# Patient Record
Sex: FEMALE | Race: ASIAN | NOT HISPANIC OR LATINO
[De-identification: names, ages, dates, MRNs, and addresses within clinical notes are randomized per-mention and may not be internally consistent; named-entity substitution may affect disease eponyms.]

---

## 2022-04-25 PROBLEM — Z00.00 ENCOUNTER FOR PREVENTIVE HEALTH EXAMINATION: Status: ACTIVE | Noted: 2022-04-25

## 2022-05-02 ENCOUNTER — APPOINTMENT (OUTPATIENT)
Dept: CARDIOLOGY | Facility: CLINIC | Age: 27
End: 2022-05-02
Payer: COMMERCIAL

## 2022-05-02 VITALS
DIASTOLIC BLOOD PRESSURE: 70 MMHG | WEIGHT: 128 LBS | TEMPERATURE: 97.8 F | RESPIRATION RATE: 18 BRPM | SYSTOLIC BLOOD PRESSURE: 116 MMHG | HEART RATE: 113 BPM | BODY MASS INDEX: 19.18 KG/M2 | OXYGEN SATURATION: 96 % | HEIGHT: 68.5 IN

## 2022-05-02 DIAGNOSIS — R94.31 ABNORMAL ELECTROCARDIOGRAM [ECG] [EKG]: ICD-10-CM

## 2022-05-02 DIAGNOSIS — Z01.810 ENCOUNTER FOR PREPROCEDURAL CARDIOVASCULAR EXAMINATION: ICD-10-CM

## 2022-05-02 PROCEDURE — 99203 OFFICE O/P NEW LOW 30 MIN: CPT

## 2022-05-02 PROCEDURE — 93306 TTE W/DOPPLER COMPLETE: CPT

## 2022-05-02 NOTE — DISCUSSION/SUMMARY
[FreeTextEntry1] : 26 year-old female with no significant PMH presents for evaluation of abnormal ECG and cardiac clearance prior to  breast surgery.  Patient reports no cardiac history.  Patient was noted to have an abnormal ECG by PCP, showing RA enlargement.  Patient denies CP, SOB, palpitations, or lightheadedness.  Patient denies h/o syncope.  I advised patient to undergo an echocardiogram.  Patient underwent an echocardiogram and it showed normal LV function without significant valvular pathology. \par \par (1) Cardiac clearance prior to breast surgery - Patient reports no cardiac history.  Patient denies exertional symptoms.  She has an abnormal ECG, showing KYLER.  Patient underwent an echocardiogram and it showed normal LV function without significant valvular pathology.  RA was normal in size on echo.  Patient is cleared for surgery and anesthesia.  \par \par (2) Followup - as needed.

## 2022-05-02 NOTE — HISTORY OF PRESENT ILLNESS
[FreeTextEntry1] : 26 year-old female with no significant PMH presents for evaluation of abnormal ECG and cardiac clearance prior to  breast surgery.  Patient reports no cardiac history.  Patient was noted to have an abnormal ECG by PCP, showing RA enlargement.  Patient denies CP, SOB, palpitations, or lightheadedness.  Patient denies h/o syncope.  I advised patient to undergo an echocardiogram.  Patient underwent an echocardiogram and it showed normal LV function without significant valvular pathology.

## 2025-06-16 ENCOUNTER — EMERGENCY (EMERGENCY)
Facility: HOSPITAL | Age: 30
LOS: 1 days | End: 2025-06-16
Attending: STUDENT IN AN ORGANIZED HEALTH CARE EDUCATION/TRAINING PROGRAM
Payer: COMMERCIAL

## 2025-06-16 VITALS
TEMPERATURE: 101 F | SYSTOLIC BLOOD PRESSURE: 99 MMHG | DIASTOLIC BLOOD PRESSURE: 69 MMHG | HEART RATE: 105 BPM | RESPIRATION RATE: 16 BRPM | WEIGHT: 119.93 LBS | OXYGEN SATURATION: 100 %

## 2025-06-16 VITALS
DIASTOLIC BLOOD PRESSURE: 55 MMHG | TEMPERATURE: 99 F | HEART RATE: 86 BPM | OXYGEN SATURATION: 97 % | RESPIRATION RATE: 16 BRPM | SYSTOLIC BLOOD PRESSURE: 98 MMHG

## 2025-06-16 LAB
ALBUMIN SERPL ELPH-MCNC: 4.2 G/DL — SIGNIFICANT CHANGE UP (ref 3.3–5)
ALP SERPL-CCNC: 52 U/L — SIGNIFICANT CHANGE UP (ref 40–120)
ALT FLD-CCNC: 12 U/L — SIGNIFICANT CHANGE UP (ref 10–45)
ANION GAP SERPL CALC-SCNC: 18 MMOL/L — HIGH (ref 5–17)
APPEARANCE UR: CLEAR — SIGNIFICANT CHANGE UP
AST SERPL-CCNC: 16 U/L — SIGNIFICANT CHANGE UP (ref 10–40)
BASOPHILS # BLD AUTO: 0 K/UL — SIGNIFICANT CHANGE UP (ref 0–0.2)
BASOPHILS NFR BLD AUTO: 0 % — SIGNIFICANT CHANGE UP (ref 0–2)
BILIRUB SERPL-MCNC: 0.7 MG/DL — SIGNIFICANT CHANGE UP (ref 0.2–1.2)
BILIRUB UR-MCNC: NEGATIVE — SIGNIFICANT CHANGE UP
BUN SERPL-MCNC: 20 MG/DL — SIGNIFICANT CHANGE UP (ref 7–23)
CALCIUM SERPL-MCNC: 9 MG/DL — SIGNIFICANT CHANGE UP (ref 8.4–10.5)
CHLORIDE SERPL-SCNC: 102 MMOL/L — SIGNIFICANT CHANGE UP (ref 96–108)
CO2 SERPL-SCNC: 20 MMOL/L — LOW (ref 22–31)
COLOR SPEC: YELLOW — SIGNIFICANT CHANGE UP
CREAT SERPL-MCNC: 0.7 MG/DL — SIGNIFICANT CHANGE UP (ref 0.5–1.3)
DIFF PNL FLD: NEGATIVE — SIGNIFICANT CHANGE UP
EGFR: 120 ML/MIN/1.73M2 — SIGNIFICANT CHANGE UP
EGFR: 120 ML/MIN/1.73M2 — SIGNIFICANT CHANGE UP
EOSINOPHIL # BLD AUTO: 0 K/UL — SIGNIFICANT CHANGE UP (ref 0–0.5)
EOSINOPHIL NFR BLD AUTO: 0 % — SIGNIFICANT CHANGE UP (ref 0–6)
FLUAV AG NPH QL: SIGNIFICANT CHANGE UP
FLUBV AG NPH QL: SIGNIFICANT CHANGE UP
GAS PNL BLDV: SIGNIFICANT CHANGE UP
GAS PNL BLDV: SIGNIFICANT CHANGE UP
GIANT PLATELETS BLD QL SMEAR: PRESENT — SIGNIFICANT CHANGE UP
GLUCOSE SERPL-MCNC: 127 MG/DL — HIGH (ref 70–99)
GLUCOSE UR QL: NEGATIVE MG/DL — SIGNIFICANT CHANGE UP
HCG SERPL-ACNC: <2 MIU/ML — SIGNIFICANT CHANGE UP
HCT VFR BLD CALC: 41.2 % — SIGNIFICANT CHANGE UP (ref 34.5–45)
HGB BLD-MCNC: 14.2 G/DL — SIGNIFICANT CHANGE UP (ref 11.5–15.5)
KETONES UR QL: 15 MG/DL
LEUKOCYTE ESTERASE UR-ACNC: NEGATIVE — SIGNIFICANT CHANGE UP
LIDOCAIN IGE QN: 29 U/L — SIGNIFICANT CHANGE UP (ref 7–60)
LYMPHOCYTES # BLD AUTO: 0.39 K/UL — LOW (ref 1–3.3)
LYMPHOCYTES # BLD AUTO: 3.4 % — LOW (ref 13–44)
MANUAL SMEAR VERIFICATION: SIGNIFICANT CHANGE UP
MCHC RBC-ENTMCNC: 31.9 PG — SIGNIFICANT CHANGE UP (ref 27–34)
MCHC RBC-ENTMCNC: 34.5 G/DL — SIGNIFICANT CHANGE UP (ref 32–36)
MCV RBC AUTO: 92.6 FL — SIGNIFICANT CHANGE UP (ref 80–100)
MONOCYTES # BLD AUTO: 0.9 K/UL — SIGNIFICANT CHANGE UP (ref 0–0.9)
MONOCYTES NFR BLD AUTO: 7.8 % — SIGNIFICANT CHANGE UP (ref 2–14)
NEUTROPHILS # BLD AUTO: 10.24 K/UL — HIGH (ref 1.8–7.4)
NEUTROPHILS NFR BLD AUTO: 87.9 % — HIGH (ref 43–77)
NEUTS BAND # BLD: 0.9 % — SIGNIFICANT CHANGE UP (ref 0–8)
NEUTS BAND NFR BLD: 0.9 % — SIGNIFICANT CHANGE UP (ref 0–8)
NITRITE UR-MCNC: NEGATIVE — SIGNIFICANT CHANGE UP
PH UR: >=9 (ref 5–8)
PLAT MORPH BLD: NORMAL — SIGNIFICANT CHANGE UP
PLATELET # BLD AUTO: 192 K/UL — SIGNIFICANT CHANGE UP (ref 150–400)
POTASSIUM SERPL-MCNC: 3.8 MMOL/L — SIGNIFICANT CHANGE UP (ref 3.5–5.3)
POTASSIUM SERPL-SCNC: 3.8 MMOL/L — SIGNIFICANT CHANGE UP (ref 3.5–5.3)
PROT SERPL-MCNC: 7.2 G/DL — SIGNIFICANT CHANGE UP (ref 6–8.3)
PROT UR-MCNC: SIGNIFICANT CHANGE UP MG/DL
RBC # BLD: 4.45 M/UL — SIGNIFICANT CHANGE UP (ref 3.8–5.2)
RBC # FLD: 12.2 % — SIGNIFICANT CHANGE UP (ref 10.3–14.5)
RBC BLD AUTO: SIGNIFICANT CHANGE UP
RSV RNA NPH QL NAA+NON-PROBE: SIGNIFICANT CHANGE UP
SARS-COV-2 RNA SPEC QL NAA+PROBE: SIGNIFICANT CHANGE UP
SODIUM SERPL-SCNC: 140 MMOL/L — SIGNIFICANT CHANGE UP (ref 135–145)
SOURCE RESPIRATORY: SIGNIFICANT CHANGE UP
SP GR SPEC: 1.02 — SIGNIFICANT CHANGE UP (ref 1–1.03)
UROBILINOGEN FLD QL: 1 MG/DL — SIGNIFICANT CHANGE UP (ref 0.2–1)
WBC # BLD: 11.53 K/UL — HIGH (ref 3.8–10.5)
WBC # FLD AUTO: 11.53 K/UL — HIGH (ref 3.8–10.5)

## 2025-06-16 PROCEDURE — 74177 CT ABD & PELVIS W/CONTRAST: CPT | Mod: 26

## 2025-06-16 PROCEDURE — 96374 THER/PROPH/DIAG INJ IV PUSH: CPT | Mod: XU

## 2025-06-16 PROCEDURE — 82803 BLOOD GASES ANY COMBINATION: CPT

## 2025-06-16 PROCEDURE — 96375 TX/PRO/DX INJ NEW DRUG ADDON: CPT

## 2025-06-16 PROCEDURE — 81003 URINALYSIS AUTO W/O SCOPE: CPT

## 2025-06-16 PROCEDURE — 74177 CT ABD & PELVIS W/CONTRAST: CPT

## 2025-06-16 PROCEDURE — 85025 COMPLETE CBC W/AUTO DIFF WBC: CPT

## 2025-06-16 PROCEDURE — 84132 ASSAY OF SERUM POTASSIUM: CPT

## 2025-06-16 PROCEDURE — 82330 ASSAY OF CALCIUM: CPT

## 2025-06-16 PROCEDURE — 84295 ASSAY OF SERUM SODIUM: CPT

## 2025-06-16 PROCEDURE — 71045 X-RAY EXAM CHEST 1 VIEW: CPT

## 2025-06-16 PROCEDURE — 71045 X-RAY EXAM CHEST 1 VIEW: CPT | Mod: 26

## 2025-06-16 PROCEDURE — 99284 EMERGENCY DEPT VISIT MOD MDM: CPT | Mod: 25

## 2025-06-16 PROCEDURE — 87637 SARSCOV2&INF A&B&RSV AMP PRB: CPT

## 2025-06-16 PROCEDURE — 84702 CHORIONIC GONADOTROPIN TEST: CPT

## 2025-06-16 PROCEDURE — 85018 HEMOGLOBIN: CPT

## 2025-06-16 PROCEDURE — 80053 COMPREHEN METABOLIC PANEL: CPT

## 2025-06-16 PROCEDURE — 82947 ASSAY GLUCOSE BLOOD QUANT: CPT

## 2025-06-16 PROCEDURE — 85014 HEMATOCRIT: CPT

## 2025-06-16 PROCEDURE — 82435 ASSAY OF BLOOD CHLORIDE: CPT

## 2025-06-16 PROCEDURE — 76705 ECHO EXAM OF ABDOMEN: CPT

## 2025-06-16 PROCEDURE — 76705 ECHO EXAM OF ABDOMEN: CPT | Mod: 26

## 2025-06-16 PROCEDURE — 83605 ASSAY OF LACTIC ACID: CPT

## 2025-06-16 PROCEDURE — 83690 ASSAY OF LIPASE: CPT

## 2025-06-16 RX ORDER — ONDANSETRON HCL/PF 4 MG/2 ML
1 VIAL (ML) INJECTION
Qty: 1 | Refills: 0
Start: 2025-06-16 | End: 2025-06-18

## 2025-06-16 RX ORDER — ONDANSETRON HCL/PF 4 MG/2 ML
4 VIAL (ML) INJECTION ONCE
Refills: 0 | Status: COMPLETED | OUTPATIENT
Start: 2025-06-16 | End: 2025-06-16

## 2025-06-16 RX ORDER — SODIUM CHLORIDE 9 G/1000ML
1000 INJECTION, SOLUTION INTRAVENOUS ONCE
Refills: 0 | Status: COMPLETED | OUTPATIENT
Start: 2025-06-16 | End: 2025-06-16

## 2025-06-16 RX ADMIN — Medication 1000 MILLILITER(S): at 01:41

## 2025-06-16 RX ADMIN — Medication 20 MILLIGRAM(S): at 01:35

## 2025-06-16 RX ADMIN — Medication 4 MILLIGRAM(S): at 01:34

## 2025-06-16 RX ADMIN — SODIUM CHLORIDE 1000 MILLILITER(S): 9 INJECTION, SOLUTION INTRAVENOUS at 02:39

## 2025-06-16 NOTE — ED PROVIDER NOTE - PROGRESS NOTE DETAILS
informed pt of test restuls regarding normal ct scan, dilated CBD, pt w/ normal LFt/bilrubin, will have outpt follow up w/ gi if able to tolerate PO, abd exam soft nt nd, pt states she feels much improved, will have brat diet EM PGY-2/Cole, DO: Tolerated PO. I have discussed all results, discharge instructions, strict return precautions and need for follow up with patient. They have verbalized understanding and agreement to plan at this time. Amenable to discharge. informed pt of test restuls regarding normal ct scan, dilated CBD and hepatic granuloma, pt w/ normal LFt/bilrubin, will have outpt follow up w/ gi if able to tolerate PO, abd exam soft nt nd, pt states she feels much improved, will have brat diet

## 2025-06-16 NOTE — ED PROVIDER NOTE - ATTENDING CONTRIBUTION TO CARE
30 F w prior breast augmentation, here w/ n/v abd cramping at 8 PM. bibems  here w/ boyfriend at bedside, menstrual period ended yesterday  pt w no cp, no sob.   on exam, pt is awake and alert oriented x3, has soft abdomen, no 30 F w prior breast augmentation, here w/ n/v abd cramping at 8 PM. bibems  here w/ boyfriend at bedside, menstrual period ended yesterday  pt w no cp, no sob. pt states that she thinks that this is 2/2 eating bad foot.   on exam, pt is awake and alert oriented x3, has soft abdomen, w/ RUQ tenderness no cva tenderness nonlabored respirations, pt w/ no c/t/l spine tenderness, pt w/ no lower leg edema, pt reports feeling uncomfortable and doesn't want to talk. boyfriend at bedside who pt wants to speak. he also had similar food, pts boyfriend not sick. plan for labs imaigng and reassessment c/f gastritis vs pancreatitis, vs less likely ectopic, vs gastroenteritis however pt w/ no diarrhea. dispo pending results

## 2025-06-16 NOTE — ED PROVIDER NOTE - CLINICAL SUMMARY MEDICAL DECISION MAKING FREE TEXT BOX
EM PGY-2/Cole DO: 30-year-old female no reported PMHx, PSHx breast implants, NKDA, LMP ended yesterday presents to ED for evaluation of abrupt onset RUQ abdominal pain with associated nausea and vomiting that began around 5 hours ago last night around 8 PM.  Patient endorses multiple episodes of nb/nb vomiting since onset. Patient boyfriend at bedside states that they had the same meal and about an hour later her symptoms began, boyfriend states he feels fine.  Patient denies any diarrhea, blood in stool, dysuria, hematuria, fevers, chills, cough, congestion, chest pain, SOB, rashes, fevers, back pain, numbness, tingling, lightheadedness, dizziness, vision changes.  No meds taken PTA.     MDM: 30-year-old female with above-mentioned history presenting to ED for 5-hour onset abdominal pain with intractable N/V.  On exam patient writhing in discomfort, nontoxic-appearing, speaks in full sentences with appropriate phonation.  Neck F ROM, no nuchal rigidity, CV tachycardic, no MGR appreciated, lungs CTAB, abdomen soft, nondistended, RUQ TTP with no overt peritoneal signs, no CVA TTP, color appropriate for race, skin appears well-perfused, extremities equal in size bilaterally nonedematous, moving all extremities, no gross focal deficits.  VS tachycardic, low-grade temperature 100.8. DDx includes gastroenteritis, cholecystitis, pancreatitis, UTI/pyelonephritis, less likely appendicitis, ectopic as tenderness localized to RUQ.  Based on presentation will obtain labs, urinalysis, ultrasound RUQ, treat symptoms give fluids and reevaluate patient, will consider need for further imaging based on results and symptoms.  Reeval to dispo.

## 2025-06-16 NOTE — ED PROVIDER NOTE - PATIENT PORTAL LINK FT
You can access the FollowMyHealth Patient Portal offered by [facility] by registering at the following website: [email or web address]. By joining [employer]’s FollowMyHealth portal, you will also be able to view your health information using other applications (apps) compatible with our system.

## 2025-06-16 NOTE — ED PROVIDER NOTE - PHYSICAL EXAMINATION
Patient reports she needs a refill on amtriptyline 25 mg. Please send to [facility] in [place].  # [phone].    writhing in discomfort, nontoxic-appearing, speaks in full sentences with appropriate phonation.  Neck F ROM, no nuchal rigidity, CV tachycardic, no MGR appreciated, lungs CTAB, abdomen soft, nondistended, RUQ TTP with no overt peritoneal signs, no CVA TTP, color appropriate for race, skin appears well-perfused, extremities equal in size bilaterally nonedematous, moving all extremities, no gross focal deficits.

## 2025-06-16 NOTE — ED ADULT NURSE NOTE - OBJECTIVE STATEMENT
29 y/o F, denies significant medical hx, presents to the ED via EMS for NV, starting at 8pm. pt family at bed side for eval, states pt ate dinner, took a nap, then started to vomit after waking up, non bloody. LMP 6/14. pt denies fever, diarrhea, cp, sob, hematuria, dysuria, back pain. pt is uncomfortable, A&Ox4, speech is clear, following commands.

## 2025-06-16 NOTE — ED ADULT NURSE NOTE - NSSEPSISNEWALTERMENTAL_ED_A_ED
Problem: Postpartum  Goal: Experiences normal postpartum course  Description:  Postpartum OB-Pregnancy care plan goal which identifies if the mother is experiencing a normal postpartum course  Outcome: Progressing  Flowsheets (Taken 2024)  Experiences Normal Postpartum Course:   Monitor maternal vital signs   Assess uterine involution     Problem: Postpartum  Goal: Appropriate maternal -  bonding  Description:  Postpartum OB-Pregnancy care plan goal which identifies if the mother and  are bonding appropriately  Outcome: Progressing  Note: Bonding with baby, participating in infant care.      Problem: Postpartum  Goal: Establishment of infant feeding pattern  Description:  Postpartum OB-Pregnancy care plan goal which identifies if the mother is establishing a feeding pattern with their   Outcome: Progressing  Flowsheets (Taken 2024)  Establishment of Infant Feeding Pattern:   Assess breast/bottle feeding   Refer to lactation as needed     Problem: Postpartum  Goal: Incisions, wounds, or drain sites healing without S/S of infection  Outcome: Progressing  Flowsheets (Taken 2024)  Incisions, Wounds, or Drain Sites Healing Without Sign and Symptoms of Infection: TWICE DAILY: Assess and document dressing/incision, wound bed, drain sites and surrounding tissue     Problem: Pain  Goal: Verbalizes/displays adequate comfort level or baseline comfort level  Outcome: Progressing  Flowsheets (Taken 2024 1608)  Verbalizes/displays adequate comfort level or baseline comfort level:   Assess pain using appropriate pain scale   Administer analgesics based on type and severity of pain and evaluate response   Encourage patient to monitor pain and request assistance   Implement non-pharmacological measures as appropriate and evaluate response     Problem: Infection - Adult  Goal: Absence of infection at discharge  Outcome: Progressing  Flowsheets (Taken 2024)  Absence of  No

## 2025-06-16 NOTE — ED PROVIDER NOTE - NSFOLLOWUPINSTRUCTIONS_ED_ALL_ED_FT
You have been evaluated in the Emergency Department today for abdominal pain. Your evaluation did not show evidence of medical conditions requiring emergent intervention at this time. The results of your labs and imaging are included in your discharge instructions -- bring with you to follow up appointments.    YOUR CT SCAN SHOWED A LARGE COMMON BILE DUCT (PART OF YOUR GALLBLADDER) AND A GRANULOMA (USUALLY ASSOCIATED WITH INFLAMMATION). I have notified someone in our system to contact you within the next 2-3 business days to help set up an appointment with an GASTROENTEROLOGIST (STOMACH/ABDOMEN DOCTOR). Please schedule that appointment as soon as you are able to. If you do not hear from them in the next few business days, please call the number listed on the first page of your discharge paperwork.     I have sent the following prescription(s) to your pharmacy:  - Zofran 4 mg: take 1 tab every 8 hours AS NEEDED for nausea and/or vomiting    UNLESS OTHERWISE DIRECTED BY YOUR PRIMARY DOCTOR, for discomfort at home, you can alternate between 600mg ibuprofen (Motrin, Alleve, Advil, etc.) and 650-975mg acetaminophen (Tylenol) every 6-8 hours. If your pain is severe, you can take them both together at the same time. Please do not exceed 3200mg ibuprofen or 4000mg Tylenol in one day. Please consult with your primary doctor before taking any medications on a regular basis.    Return to the Emergency Department if you experience worsening or uncontrolled pain, fevers 100.4°F or greater, recurrent vomiting, inability to tolerate food or fluids by mouth, bloody stools or vomit, black or tarry stools, or any other concerning symptoms.